# Patient Record
Sex: MALE | Race: BLACK OR AFRICAN AMERICAN | URBAN - METROPOLITAN AREA
[De-identification: names, ages, dates, MRNs, and addresses within clinical notes are randomized per-mention and may not be internally consistent; named-entity substitution may affect disease eponyms.]

---

## 2023-01-05 ENCOUNTER — OFFICE VISIT (OUTPATIENT)
Dept: ORTHOPEDIC SURGERY | Age: 68
End: 2023-01-05

## 2023-01-05 VITALS — BODY MASS INDEX: 26.07 KG/M2 | WEIGHT: 176 LBS | HEIGHT: 69 IN

## 2023-01-05 DIAGNOSIS — M12.811 ROTATOR CUFF ARTHROPATHY OF RIGHT SHOULDER: Primary | ICD-10-CM

## 2023-01-05 DIAGNOSIS — M25.511 RIGHT SHOULDER PAIN, UNSPECIFIED CHRONICITY: ICD-10-CM

## 2023-01-05 PROBLEM — N13.8 BENIGN PROSTATIC HYPERPLASIA WITH URINARY OBSTRUCTION: Status: ACTIVE | Noted: 2017-04-28

## 2023-01-05 PROBLEM — R55 POSTURAL DIZZINESS WITH NEAR SYNCOPE: Status: ACTIVE | Noted: 2021-06-22

## 2023-01-05 PROBLEM — H90.5 SENSORINEURAL HEARING LOSS (SNHL): Status: ACTIVE | Noted: 2017-04-28

## 2023-01-05 PROBLEM — I73.9 PERIPHERAL ARTERIAL DISEASE (HCC): Status: ACTIVE | Noted: 2021-10-06

## 2023-01-05 PROBLEM — N40.1 BENIGN PROSTATIC HYPERPLASIA WITH URINARY OBSTRUCTION: Status: ACTIVE | Noted: 2017-04-28

## 2023-01-05 PROBLEM — I25.10 CORONARY ARTERY DISEASE INVOLVING NATIVE CORONARY ARTERY OF NATIVE HEART WITHOUT ANGINA PECTORIS: Status: ACTIVE | Noted: 2017-04-28

## 2023-01-05 PROBLEM — K21.9 ESOPHAGEAL REFLUX: Status: ACTIVE | Noted: 2018-11-07

## 2023-01-05 PROBLEM — R42 POSTURAL DIZZINESS WITH NEAR SYNCOPE: Status: ACTIVE | Noted: 2021-06-22

## 2023-01-05 PROBLEM — B18.2 CARRIER OF VIRAL HEPATITIS C (HCC): Status: ACTIVE | Noted: 2018-11-07

## 2023-01-05 PROBLEM — I10 ESSENTIAL HYPERTENSION: Status: ACTIVE | Noted: 2017-04-28

## 2023-01-05 RX ORDER — ONDANSETRON 4 MG/1
4 TABLET, FILM COATED ORAL 3 TIMES DAILY PRN
COMMUNITY
Start: 2018-03-08

## 2023-01-05 RX ORDER — AMLODIPINE BESYLATE 10 MG/1
10 TABLET ORAL NIGHTLY
COMMUNITY
Start: 2022-09-30 | End: 2023-09-30

## 2023-01-05 RX ORDER — MEMANTINE HYDROCHLORIDE 10 MG/1
10 TABLET ORAL 2 TIMES DAILY
COMMUNITY
Start: 2022-01-06

## 2023-01-05 RX ORDER — MONTELUKAST SODIUM 10 MG/1
10 TABLET ORAL EVERY MORNING
COMMUNITY
Start: 2022-11-28

## 2023-01-05 RX ORDER — VALSARTAN AND HYDROCHLOROTHIAZIDE 160; 12.5 MG/1; MG/1
TABLET, FILM COATED ORAL
COMMUNITY

## 2023-01-05 RX ORDER — OMEPRAZOLE 20 MG/1
20 CAPSULE, DELAYED RELEASE ORAL
COMMUNITY

## 2023-01-05 RX ORDER — OLMESARTAN MEDOXOMIL 20 MG/1
20 TABLET ORAL DAILY
COMMUNITY
Start: 2022-02-02

## 2023-01-05 RX ORDER — CLONIDINE HYDROCHLORIDE 0.3 MG/1
1 TABLET ORAL 2 TIMES DAILY
COMMUNITY

## 2023-01-05 RX ORDER — TRAZODONE HYDROCHLORIDE 100 MG/1
TABLET ORAL
COMMUNITY
Start: 2022-11-28

## 2023-01-05 RX ORDER — TRIAMCINOLONE ACETONIDE 40 MG/ML
40 INJECTION, SUSPENSION INTRA-ARTICULAR; INTRAMUSCULAR ONCE
Status: COMPLETED | OUTPATIENT
Start: 2023-01-05 | End: 2023-01-05

## 2023-01-05 RX ORDER — LACTULOSE 10 G/15ML
SOLUTION ORAL
COMMUNITY
Start: 2023-01-03

## 2023-01-05 RX ORDER — ATORVASTATIN CALCIUM 20 MG/1
20 TABLET, FILM COATED ORAL DAILY
COMMUNITY
Start: 2022-09-30 | End: 2023-09-30

## 2023-01-05 RX ORDER — CHLORTHALIDONE 25 MG/1
25 TABLET ORAL EVERY MORNING
COMMUNITY
Start: 2022-09-30 | End: 2023-09-30

## 2023-01-05 RX ORDER — TEMAZEPAM 15 MG/1
1 CAPSULE ORAL
COMMUNITY

## 2023-01-05 RX ORDER — CARVEDILOL 12.5 MG/1
TABLET ORAL
COMMUNITY
Start: 2022-12-05

## 2023-01-05 RX ADMIN — TRIAMCINOLONE ACETONIDE 40 MG: 40 INJECTION, SUSPENSION INTRA-ARTICULAR; INTRAMUSCULAR at 11:47

## 2023-01-05 NOTE — PROGRESS NOTES
Name: Buster Lay  YOB: 1955  Gender: male  MRN: [id number]      CC: Shoulder Pain (R Shoulder)       HPI: Buster Lay is a 78 y.o. male who presents with Shoulder Pain (R Shoulder)  Pt reports pain began about 4 months ago, was picking up a can of gas and felt pain in his shoulder. No history of injury to right shoulder. Pain initially felt sharp, no reports of numbness or tingling. Pain has since subsided, has been using lidocaine patches. Having difficulty lifting his arm due to pain. Does not bother him when he is resting. ROS/Meds/PSH/PMH/FH/SH: I personally reviewed the patients standard intake form. Below are the pertinents    Tobacco:  reports that he has been smoking cigarettes. He has never used smokeless tobacco.  Diabetes: none  Other: HTN, PAD, GERD, hepatitis C    Physical Examination:  General: no acute distress  Lungs: breathing easily  CV: regular rhythm by pulse  Right Shoulder: No obvious deformity of the biceps. Tenderness to palpation anterior shoulder around the bicipital groove. Active forward flexion to 140 degrees limited by pain, passively to 180. External rotation with elbows at side equal bilaterally. External rotation with elbows at side resisted range of motion 4 -/5 strength. Positive empty can test with 4 -/5 strength empty can position. Positive external rotation lag sign negative SharonTorie Tran's. Pain with [place]'s and speeds. Imaging:   Shoulder XR: Grashey, Axillary and Scapula Y views     Clinical Indication:  1. Rotator cuff arthropathy of right shoulder    2.  Right shoulder pain, unspecified chronicity           Report: Bogdan Cast, Axillary and Scapula Y XRs of the Right shoulder demonstrates humeral head elevation with decreased acromiohumeral distance consistent with rotator cuff arthropathy    Impression: Rotator cuff arthropathy     I reviewed an MRI performed at [facility] radiology on 12/14/2022 scanned into our system which shows history of a previous rotator cuff repair. He does have a full-thickness full width tear of the supraspinatus tendon with retraction and grade 4 fatty atrophy with a positive tangent sign. Moderate grade interstitial tear of the distal subscapularis tendon. Moderately large joint effusion with reactive subacromial bursa and fluid in the [facility] joint. All imaging interpreted by myself Eugene Washington MD independent of radiology review        Assessment:     ICD-10-CM    1. Rotator cuff arthropathy of right shoulder  M12.811 triamcinolone acetonide (KENALOG-40) injection 40 mg     DRAIN/INJECT LARGE JOINT/BURSA      2. Right shoulder pain, unspecified chronicity  M25.511 XR SHOULDER RIGHT (MIN 2 VIEWS)     triamcinolone acetonide (KENALOG-40) injection 40 mg     DRAIN/INJECT LARGE JOINT/BURSA          Plan:   I discussed with the patient that he does have a rotator cuff tear however due to the atrophy on MRI I do not think he is appropriate for rotator cuff repair. I think this was likely an acute on chronic tear. He does say that he had some chronically worsening shoulder pain. Discussed his options for surgical repair to include an in space  balloon versus reverse total shoulder replacement. He does not wish to proceed with shoulder surgery at this time therefore I discussed conservative treatment options to include corticosteroid injection and formal physical therapy. Provided him with a home exercise program rotator cuff deficient deltoid strengthening. I think he would benefit from corticosteroid injection which he agreed to proceed with today. The patient elected to proceed with a subacromial injection today. After verbal informed consent was obtained after sterile prep the right shoulder subacromial space was injected from a posterior lateral approach with 6 cc of 0.25% Marcaine and 1 cc of 40 mg Kenalog.   The patient tolerated the procedure well was given postinjection flare precautions. We will see him back in 6 to 8 weeks to evaluate his progress. Caryl Nelson NP dictating as a scribe for MD Annetta Franco.  Diony Abrams MD, [facility]

## 2023-03-17 ENCOUNTER — OFFICE VISIT (OUTPATIENT)
Dept: ORTHOPEDIC SURGERY | Age: 68
End: 2023-03-17

## 2023-03-17 DIAGNOSIS — M12.811 ROTATOR CUFF ARTHROPATHY OF RIGHT SHOULDER: Primary | ICD-10-CM

## 2023-03-17 DIAGNOSIS — M25.511 RIGHT SHOULDER PAIN, UNSPECIFIED CHRONICITY: ICD-10-CM

## 2023-03-17 RX ORDER — TRIAMCINOLONE ACETONIDE 40 MG/ML
40 INJECTION, SUSPENSION INTRA-ARTICULAR; INTRAMUSCULAR ONCE
Status: COMPLETED | OUTPATIENT
Start: 2023-03-17 | End: 2023-03-17

## 2023-03-17 RX ADMIN — TRIAMCINOLONE ACETONIDE 40 MG: 40 INJECTION, SUSPENSION INTRA-ARTICULAR; INTRAMUSCULAR at 08:21

## 2023-03-17 NOTE — PROGRESS NOTES
Name: Camryn Pendleton  YOB: 1955  Gender: male  MRN: [id number]      CC: Shoulder Pain (R shoulder )       HPI: Camryn Pendleton is a 78 y.o. male who returns for follow up on 3/17/2023. He reports he is feeling okay, he is interested in an injection today. For that he got 6 weeks of 50% relief from the previous injection. He also reports that he has been performing his home exercise program that was provided at his last visit. Physical Examination:  General: no acute distress  Lungs: breathing easily  CV: regular rhythm by pulse  Right Shoulder: No obvious deformity of the biceps. No tenderness to palpation. Active forward flexion to 170 degrees limited by pain. External rotation with elbows at side equal bilaterally. External rotation with elbows at side resisted range of motion 4+/5 strength. Pain with empty can testing with 4+/5 strength empty can position. Assessment:   1. Rotator cuff arthropathy of right shoulder    2. Right shoulder pain, unspecified chronicity         Plan:   I revisited the discussion with the patient the due to the rotator cuff arthropathy he has he would most likely require surgery either being a balloon placement or a total shoulder and he reports that he is not willing to proceed with that at this time. He did get 50% relief from his previous injection and would like to pursue another injection prior to considering any surgical intervention. I think it is reasonable to proceed with another injection. The patient elected to proceed with a subacromial injection today. After verbal informed consent was obtained after sterile prep the right shoulder subacromial space was injected from a posterior lateral approach with 6 cc of 0.25% Marcaine and 1 cc of 40 mg Kenalog. The patient tolerated the procedure well was given postinjection flare precautions. Urged him to continue his home exercise program which he notes compliance with.     Bruce Gilliland Yousuf Mcintosh - [facility] /

## 2023-11-07 ENCOUNTER — OFFICE VISIT (OUTPATIENT)
Dept: ORTHOPEDIC SURGERY | Age: 68
End: 2023-11-07

## 2023-11-07 DIAGNOSIS — M12.811 ROTATOR CUFF ARTHROPATHY OF RIGHT SHOULDER: Primary | ICD-10-CM

## 2023-11-07 RX ORDER — TRIAMCINOLONE ACETONIDE 40 MG/ML
40 INJECTION, SUSPENSION INTRA-ARTICULAR; INTRAMUSCULAR ONCE
Status: COMPLETED | OUTPATIENT
Start: 2023-11-07 | End: 2023-11-07

## 2023-11-07 RX ADMIN — TRIAMCINOLONE ACETONIDE 40 MG: 40 INJECTION, SUSPENSION INTRA-ARTICULAR; INTRAMUSCULAR at 16:02

## 2023-11-07 NOTE — PROGRESS NOTES
Name: Frankie Shah  YOB: 1955  Gender: male  MRN: [id number]      CC: Shoulder Pain (R)       HPI: Frankie Shah is a 80 y.o. male who returns for follow up on shoulder pain. He states that he is in [address] [place] and got relief from his last subacromial injection for about 3 months. He states that the shot started to wear off and when the weather turned cold he had increased pain in this right shoulder. He understands that his rotator cuff is torn but the correct surgery for him would be a reverse total shoulder arthroplasty versus in space balloon surgery. Physical Examination:  General: no acute distress  Lungs: breathing easily  CV: regular rhythm by pulse  Right Shoulder: No previous surgical scars noted. No gross deformity noted. No bruising, rashes, wounds or sores noted. Tenderness along the bicipital groove. No tenderness at [facility] joint. Active forward flexion to 130 with some   pain. Active abduction to 180 without pain. 5/5 scaption strength. 40 Degrees of active external rotation negative without pain. 5/5 external rotation strength. 90 Degrees of active internal rotation without pain. 5/5 internal rotation strength about pain. Negative Funez and negative Neer signs. Negative Craig's test.  Negative [place]'s test.  Sensation intact along radial, ulnar and median nerve.  strength 5 out of 5 when compared to opposite side. Distal radius pulse 2+. Assessment:     ICD-10-CM    1. Rotator cuff arthropathy of right shoulder  M12.811 DRAIN/INJECT LARGE JOINT/BURSA     triamcinolone acetonide (KENALOG-40) injection 40 mg          Plan:     He asked for an injection today. We discussed surgical options again but he does not want to proceed with these at this time. We discussed a home exercise program which he works on his own and will continue to do so.   He is still taking tramadol given to him by his primary care provider which helps with some of